# Patient Record
Sex: FEMALE | Race: WHITE | NOT HISPANIC OR LATINO | URBAN - METROPOLITAN AREA
[De-identification: names, ages, dates, MRNs, and addresses within clinical notes are randomized per-mention and may not be internally consistent; named-entity substitution may affect disease eponyms.]

---

## 2021-06-29 ENCOUNTER — EMERGENCY (EMERGENCY)
Facility: HOSPITAL | Age: 61
LOS: 1 days | Discharge: HOME | End: 2021-06-29
Attending: EMERGENCY MEDICINE | Admitting: EMERGENCY MEDICINE
Payer: MEDICAID

## 2021-06-29 VITALS
HEART RATE: 61 BPM | WEIGHT: 149.91 LBS | SYSTOLIC BLOOD PRESSURE: 161 MMHG | HEIGHT: 66 IN | TEMPERATURE: 98 F | OXYGEN SATURATION: 96 % | RESPIRATION RATE: 16 BRPM | DIASTOLIC BLOOD PRESSURE: 81 MMHG

## 2021-06-29 DIAGNOSIS — R42 DIZZINESS AND GIDDINESS: ICD-10-CM

## 2021-06-29 DIAGNOSIS — E11.9 TYPE 2 DIABETES MELLITUS WITHOUT COMPLICATIONS: ICD-10-CM

## 2021-06-29 DIAGNOSIS — I10 ESSENTIAL (PRIMARY) HYPERTENSION: ICD-10-CM

## 2021-06-29 DIAGNOSIS — R55 SYNCOPE AND COLLAPSE: ICD-10-CM

## 2021-06-29 DIAGNOSIS — E03.9 HYPOTHYROIDISM, UNSPECIFIED: ICD-10-CM

## 2021-06-29 DIAGNOSIS — E78.5 HYPERLIPIDEMIA, UNSPECIFIED: ICD-10-CM

## 2021-06-29 LAB
ALBUMIN SERPL ELPH-MCNC: 4.6 G/DL — SIGNIFICANT CHANGE UP (ref 3.3–5)
ALP SERPL-CCNC: 91 U/L — SIGNIFICANT CHANGE UP (ref 40–120)
ALT FLD-CCNC: 40 U/L — SIGNIFICANT CHANGE UP (ref 10–45)
ANION GAP SERPL CALC-SCNC: 13 MMOL/L — SIGNIFICANT CHANGE UP (ref 5–17)
APPEARANCE UR: CLEAR — SIGNIFICANT CHANGE UP
AST SERPL-CCNC: 36 U/L — SIGNIFICANT CHANGE UP (ref 10–40)
BACTERIA # UR AUTO: PRESENT /HPF
BASOPHILS # BLD AUTO: 0.05 K/UL — SIGNIFICANT CHANGE UP (ref 0–0.2)
BASOPHILS NFR BLD AUTO: 0.5 % — SIGNIFICANT CHANGE UP (ref 0–2)
BILIRUB SERPL-MCNC: 0.3 MG/DL — SIGNIFICANT CHANGE UP (ref 0.2–1.2)
BILIRUB UR-MCNC: NEGATIVE — SIGNIFICANT CHANGE UP
BUN SERPL-MCNC: 22 MG/DL — SIGNIFICANT CHANGE UP (ref 7–23)
CALCIUM SERPL-MCNC: 9.7 MG/DL — SIGNIFICANT CHANGE UP (ref 8.4–10.5)
CHLORIDE SERPL-SCNC: 103 MMOL/L — SIGNIFICANT CHANGE UP (ref 96–108)
CO2 SERPL-SCNC: 25 MMOL/L — SIGNIFICANT CHANGE UP (ref 22–31)
COLOR SPEC: YELLOW — SIGNIFICANT CHANGE UP
CREAT SERPL-MCNC: 0.58 MG/DL — SIGNIFICANT CHANGE UP (ref 0.5–1.3)
DIFF PNL FLD: NEGATIVE — SIGNIFICANT CHANGE UP
EOSINOPHIL # BLD AUTO: 0.14 K/UL — SIGNIFICANT CHANGE UP (ref 0–0.5)
EOSINOPHIL NFR BLD AUTO: 1.3 % — SIGNIFICANT CHANGE UP (ref 0–6)
EPI CELLS # UR: SIGNIFICANT CHANGE UP /HPF (ref 0–5)
GLUCOSE SERPL-MCNC: 126 MG/DL — HIGH (ref 70–99)
GLUCOSE UR QL: NEGATIVE — SIGNIFICANT CHANGE UP
HCT VFR BLD CALC: 43.6 % — SIGNIFICANT CHANGE UP (ref 34.5–45)
HGB BLD-MCNC: 14.3 G/DL — SIGNIFICANT CHANGE UP (ref 11.5–15.5)
IMM GRANULOCYTES NFR BLD AUTO: 0.7 % — SIGNIFICANT CHANGE UP (ref 0–1.5)
KETONES UR-MCNC: NEGATIVE — SIGNIFICANT CHANGE UP
LEUKOCYTE ESTERASE UR-ACNC: ABNORMAL
LYMPHOCYTES # BLD AUTO: 2.36 K/UL — SIGNIFICANT CHANGE UP (ref 1–3.3)
LYMPHOCYTES # BLD AUTO: 22.6 % — SIGNIFICANT CHANGE UP (ref 13–44)
MCHC RBC-ENTMCNC: 27.9 PG — SIGNIFICANT CHANGE UP (ref 27–34)
MCHC RBC-ENTMCNC: 32.8 GM/DL — SIGNIFICANT CHANGE UP (ref 32–36)
MCV RBC AUTO: 85.2 FL — SIGNIFICANT CHANGE UP (ref 80–100)
MONOCYTES # BLD AUTO: 0.61 K/UL — SIGNIFICANT CHANGE UP (ref 0–0.9)
MONOCYTES NFR BLD AUTO: 5.8 % — SIGNIFICANT CHANGE UP (ref 2–14)
NEUTROPHILS # BLD AUTO: 7.23 K/UL — SIGNIFICANT CHANGE UP (ref 1.8–7.4)
NEUTROPHILS NFR BLD AUTO: 69.1 % — SIGNIFICANT CHANGE UP (ref 43–77)
NITRITE UR-MCNC: NEGATIVE — SIGNIFICANT CHANGE UP
NRBC # BLD: 0 /100 WBCS — SIGNIFICANT CHANGE UP (ref 0–0)
PH UR: 6 — SIGNIFICANT CHANGE UP (ref 5–8)
PLATELET # BLD AUTO: 306 K/UL — SIGNIFICANT CHANGE UP (ref 150–400)
POTASSIUM SERPL-MCNC: 4.3 MMOL/L — SIGNIFICANT CHANGE UP (ref 3.5–5.3)
POTASSIUM SERPL-SCNC: 4.3 MMOL/L — SIGNIFICANT CHANGE UP (ref 3.5–5.3)
PROT SERPL-MCNC: 7.8 G/DL — SIGNIFICANT CHANGE UP (ref 6–8.3)
PROT UR-MCNC: NEGATIVE MG/DL — SIGNIFICANT CHANGE UP
RBC # BLD: 5.12 M/UL — SIGNIFICANT CHANGE UP (ref 3.8–5.2)
RBC # FLD: 12.8 % — SIGNIFICANT CHANGE UP (ref 10.3–14.5)
RBC CASTS # UR COMP ASSIST: < 5 /HPF — SIGNIFICANT CHANGE UP
SODIUM SERPL-SCNC: 141 MMOL/L — SIGNIFICANT CHANGE UP (ref 135–145)
SP GR SPEC: 1.01 — SIGNIFICANT CHANGE UP (ref 1–1.03)
TROPONIN T SERPL-MCNC: 0.01 NG/ML — SIGNIFICANT CHANGE UP (ref 0–0.01)
UROBILINOGEN FLD QL: 0.2 E.U./DL — SIGNIFICANT CHANGE UP
WBC # BLD: 10.46 K/UL — SIGNIFICANT CHANGE UP (ref 3.8–10.5)
WBC # FLD AUTO: 10.46 K/UL — SIGNIFICANT CHANGE UP (ref 3.8–10.5)
WBC UR QL: ABNORMAL /HPF

## 2021-06-29 PROCEDURE — 70450 CT HEAD/BRAIN W/O DYE: CPT | Mod: 26,MG

## 2021-06-29 PROCEDURE — 99284 EMERGENCY DEPT VISIT MOD MDM: CPT | Mod: 25

## 2021-06-29 PROCEDURE — 84484 ASSAY OF TROPONIN QUANT: CPT

## 2021-06-29 PROCEDURE — 99285 EMERGENCY DEPT VISIT HI MDM: CPT

## 2021-06-29 PROCEDURE — 82962 GLUCOSE BLOOD TEST: CPT

## 2021-06-29 PROCEDURE — G1004: CPT

## 2021-06-29 PROCEDURE — 70450 CT HEAD/BRAIN W/O DYE: CPT

## 2021-06-29 PROCEDURE — 81001 URINALYSIS AUTO W/SCOPE: CPT

## 2021-06-29 PROCEDURE — 80053 COMPREHEN METABOLIC PANEL: CPT

## 2021-06-29 PROCEDURE — 36415 COLL VENOUS BLD VENIPUNCTURE: CPT

## 2021-06-29 PROCEDURE — 85025 COMPLETE CBC W/AUTO DIFF WBC: CPT

## 2021-06-29 NOTE — ED PROVIDER NOTE - PATIENT PORTAL LINK FT
You can access the FollowMyHealth Patient Portal offered by [facility] by registering at the following website: [email or web address]. By joining [facility]’s FollowMyHealth portal, you will also be able to view your health information using other applications (apps) compatible with our system.

## 2021-06-29 NOTE — ED PROVIDER NOTE - NSFOLLOWUPINSTRUCTIONS_ED_ALL_ED_FT
Mareos    Dizziness    Los mareos son un problema muy frecuente. Causan sensación de inestabilidad o de desvanecimiento. Puede sentir que se va a desmayar. Los mareos pueden provocarle yolette lesión si se tropieza o se . La causa puede deberse a muchos problemas, tales danielito los siguientes:  •Medicamentos.      •No tener suficiente agua en el cuerpo (deshidratación).      •Enfermedad.        Siga estas indicaciones en cordero casa:      Comida y bebida      •Mariana suficiente líquido para mantener el pis (orina) tavo o de color amarillo pálido. [place] jami la deshidratación. Trate de beber más líquidos transparentes, danielito agua.      • No mariana alcohol.      •Limite la cantidad de cafeína que harriett o come si el médico se lo indica.      •Limite la cantidad de sal (sodio) que harriett o come si el médico se lo indica.        Actividad    •Evite los movimientos rápidos.  •Cuando se levante de yolette silla, sujétese hasta sentirse mercedes.      •Por la mañana, siéntese jan a un lado de la cama. Cuando se sienta mercedes, póngase lentamente de pie mientras se sostiene de algo. Aleta esto hasta que se sienta seguro en cuanto al equilibrio.        •Mueva las piernas con frecuencia si debe estar de pie en un lugar meagan mucho tiempo. Mientras esté de pie, contraiga y relaje los músculos de las piernas.      • No conduzca vehículos ni opere maquinaria pesada si se siente mareado.      •Evite agacharse si se siente mareado. En cordero casa, coloque los objetos en algún lugar que le resulte fácil alcanzarlos sin agacharse.      Estilo de renu     • No consuma ningún producto que contenga nicotina o tabaco, danielito cigarrillos y cigarrillos electrónicos. Si necesita ayuda para dejar de fumar, consulte al médico.      •Intente bajar el nivel de estrés. Para hacerlo, puede usar métodos danielito el yoga o la meditación. Hable con el médico si necesita ayuda.      Instrucciones generales     •Controle harleen mareos para rachel si hay cambios.      •[place]arton los medicamentos de venta elliott y los recetados solamente danielito se lo haya indicado el médico. Hable con el médico si glenn que la causa de harleen mareos es algún medicamento que está tomando.      •Infórmele a un amigo o a un familiar si se siente mareado. [place]ídale a esta persona que llame al médico si observa cambios en cordero comportamiento.      •Concurra a todas las visitas de control danielito se lo haya indicado el médico. [place] es importante.        Comuníquese con un médico si:    •Los mareos persisten.      •Los mareos o la sensación de desvanecimiento empeoran.      •Siente malestar estomacal (náuseas).      •Tiene problemas para escuchar.      •Aparecen nuevos síntomas.      •Siente inestabilidad al estar de pie.      •Siente que la habitación da vueltas.        Solicite ayuda de inmediato si:    •Vomita o tiene heces acuosas (diarrea), y no puede comer o beber nada.    •Tiene dificultad para hacer lo siguiente:  •Hablar.      •Caminar.      •Tragar.      •Usar los brazos, las cristela o las piernas.        •Se siente constantemente débil.      •No piensa con claridad o tiene dificultad para armar oraciones. Es posible que un amigo o un familiar adviertan que esto ocurre.    •Tiene los siguientes síntomas:  •Dolor en el pecho.      •Dolor en el vientre (abdomen).      •Falta de aire.      •Sudoración.        •Cambios en la visión.      •Sangrado.      •Dolor de alex muy intenso.      •Dolor o rigidez en el amador.      •Fiebre.      Estos síntomas pueden indicar yolette emergencia. No espere hasta que los síntomas desaparezcan. Solicite atención médica de inmediato. Comuníquese con el servicio de emergencias de cordero localidad (911 en los [place]). No conduzca por harleen propios medios hasta el hospital.       Resumen    •Los mareos causan sensación de inestabilidad o de desvanecimiento. Puede sentir que se va a desmayar.      •Mariana suficiente líquido para mantener el pis (orina) tavo o de color amarillo pálido. No mariana alcohol.      •Evite los movimientos rápidos si se siente mareado.      •Controle harleen mareos para rachel si hay cambios.      Esta información no tiene danielito fin reemplazar el consejo del médico. Asegúrese de hacerle al médico cualquier pregunta que tenga.

## 2021-06-29 NOTE — ED PROVIDER NOTE - CLINICAL SUMMARY MEDICAL DECISION MAKING FREE TEXT BOX
62 y/o f hx HTN, HLD, DM, hypothyroid presents c/o near syncopal dizziness today.  Pt asymptomatic in ED, vss, EKG nonischemic in sinus rhythm, neuro exam intact.  Labs wnl, CT head negative.  Will d/c, advised to f/u with pmd, return to ED if sx worsen.

## 2021-06-29 NOTE — ED PROVIDER NOTE - OBJECTIVE STATEMENT
#[id number]  62 y/o f hx HTN, HLD, DM, hypothyroid presents c/o near syncopal dizziness today.  Pt was at work, work as a .  Pt reports she was bent over on an incline and felt very lightheaded, thought she may faint but sat down and didn't lose consciousness.  The owner of the home sat pt down and gave her water, called EMS and now pt feels much better.  Pt stating had similar dizziness last year, was evaluated at [facility] and discharged, was told it was vertigo.  Pt stating at that time she had some paresthesias to her left cheek and felt similar paresthesias today which have resolved.  Pt denies headache, visual changes, n/v, weakness, all other ROS negative.

## 2021-06-29 NOTE — ED PROVIDER NOTE - CARE PROVIDER_API CALL
CARE INS REF NOT REQ
Surjit Brown)  Neurology; Neuromuscular Medicine  [address], [address] [place] [address]  Phone: [phone]  Fax: [phone]  Follow Up Time:

## 2021-06-29 NOTE — ED PROVIDER NOTE - ATTENDING CONTRIBUTION TO CARE
I discussed the plan of care of the patient directly with the PA and examined the patient while in the Emergency Department. I agree with the HPI and PE as documented by the PA.  Pt is a 60yo f, h/o htn, dm, hld, hypothyroid, works as a , was stooped on an incline and felt lightheaded and faint. No loc. Pt sat down and drank water afterwards and is feeling much better. Similar episode last yr, seen at [facility] and told to be 2/2 vertigo. ? "sleepiness" to left cheek. No n/t/w elsewhere. No cp, sob, palp, syncope, back pain... Afebrile. HDS. WNWD f in nad. + s1, s2, rrr. Lungs cta b/l. Abd soft, nt/nd. No peripheral edema. 2+ pulses b/l. Neuro exam non-focal. EKG non-ischemic. Sx's likely vasovagal. Plan for labs, ct head. Will re-assess.

## 2021-06-29 NOTE — ED ADULT TRIAGE NOTE - CHIEF COMPLAINT QUOTE
Per EMS, patient had near syncopal episode at work, Patient reports feeling dizzy after getting up. Denies any chest pain.

## 2021-06-29 NOTE — ED PROVIDER NOTE - NEUROLOGICAL, MLM
Alert and oriented, no focal deficits, no motor or sensory deficits. CN II-XII intact, strength 5/5 b/l upper and lower ext, sensation intact distally b/l upper and lower ext

## 2021-07-02 PROBLEM — Z00.00 ENCOUNTER FOR PREVENTIVE HEALTH EXAMINATION: Status: ACTIVE | Noted: 2021-07-02
